# Patient Record
Sex: MALE | Race: WHITE | NOT HISPANIC OR LATINO | URBAN - METROPOLITAN AREA
[De-identification: names, ages, dates, MRNs, and addresses within clinical notes are randomized per-mention and may not be internally consistent; named-entity substitution may affect disease eponyms.]

---

## 2019-02-01 ENCOUNTER — APPOINTMENT (RX ONLY)
Dept: URBAN - METROPOLITAN AREA CLINIC 86 | Facility: CLINIC | Age: 54
Setting detail: DERMATOLOGY
End: 2019-02-01

## 2019-02-01 DIAGNOSIS — L88 PYODERMA GANGRENOSUM: ICD-10-CM | Status: WORSENING

## 2019-02-01 PROBLEM — L29.8 OTHER PRURITUS: Status: ACTIVE | Noted: 2019-02-01

## 2019-02-01 PROBLEM — E13.9 OTHER SPECIFIED DIABETES MELLITUS WITHOUT COMPLICATIONS: Status: ACTIVE | Noted: 2019-02-01

## 2019-02-01 PROBLEM — J30.1 ALLERGIC RHINITIS DUE TO POLLEN: Status: ACTIVE | Noted: 2019-02-01

## 2019-02-01 PROCEDURE — ? COUNSELING

## 2019-02-01 PROCEDURE — ? ADDITIONAL NOTES

## 2019-02-01 PROCEDURE — 99214 OFFICE O/P EST MOD 30 MIN: CPT | Mod: 25

## 2019-02-01 PROCEDURE — ? SEPARATE AND IDENTIFIABLE DOCUMENTATION

## 2019-02-01 PROCEDURE — ? INTRALESIONAL KENALOG

## 2019-02-01 PROCEDURE — ? PRESCRIPTION

## 2019-02-01 RX ORDER — FLUOCINONIDE 1 MG/G
CREAM TOPICAL
Qty: 1 | Refills: 2 | Status: ERX | COMMUNITY
Start: 2019-02-01

## 2019-02-01 RX ADMIN — FLUOCINONIDE: 1 CREAM TOPICAL at 17:40

## 2019-02-01 ASSESSMENT — LOCATION SIMPLE DESCRIPTION DERM
LOCATION SIMPLE: LEFT PRETIBIAL REGION
LOCATION SIMPLE: LEFT CALF

## 2019-02-01 ASSESSMENT — SEVERITY ASSESSMENT: SEVERITY: MILD TO MODERATE

## 2019-02-01 ASSESSMENT — LOCATION DETAILED DESCRIPTION DERM
LOCATION DETAILED: LEFT DISTAL CALF
LOCATION DETAILED: LEFT PROXIMAL PRETIBIAL REGION

## 2019-02-01 ASSESSMENT — LOCATION ZONE DERM: LOCATION ZONE: LEG

## 2019-02-01 ASSESSMENT — PAIN INTENSITY VAS: HOW INTENSE IS YOUR PAIN 0 BEING NO PAIN, 10 BEING THE MOST SEVERE PAIN POSSIBLE?: 2/10 PAIN

## 2019-02-01 NOTE — HPI: ULCER (PYODERMA GANGRENOSUM)
How Severe Is It?: moderate
Is This A New Presentation, Or A Follow-Up?: Follow Up Pyoderma Gangrenosum

## 2019-02-01 NOTE — PROCEDURE: ADDITIONAL NOTES
Detail Level: Simple
Additional Notes: With the patients history of diabetes, and localized disease, we will try intralesional Kenalog. The patient is aware he should contact our office should he develop new lesions, or his this lesion worsen.We will follow-up monthly.

## 2019-02-01 NOTE — PROCEDURE: INTRALESIONAL KENALOG
Detail Level: Simple
Concentration Of Solution Injected (Mg/Ml): 5.0
Treatment Number (Optional): 1
Include Z78.9 (Other Specified Conditions Influencing Health Status) As An Associated Diagnosis?: No
X Size Of Lesion In Cm (Optional): 0
Consent: The risks of atrophy were reviewed with the patient.
Kenalog Preparation: Kenalog